# Patient Record
Sex: MALE | Race: WHITE | NOT HISPANIC OR LATINO | Employment: OTHER | URBAN - METROPOLITAN AREA
[De-identification: names, ages, dates, MRNs, and addresses within clinical notes are randomized per-mention and may not be internally consistent; named-entity substitution may affect disease eponyms.]

---

## 2019-12-12 NOTE — PATIENT DISCUSSION
Recommend warm comp, lid scrubs, artificial tears, OTC ointment qhs and azithromycin and begin Omega 3s.

## 2020-01-16 NOTE — PATIENT DISCUSSION
Recommend continuing warm comp, lid scrubs (will try sterilid), artificial tears, OTC ointment qhs and azithromycin and begin Omega 3s.

## 2021-10-01 ENCOUNTER — NEW PATIENT COMPREHENSIVE (OUTPATIENT)
Dept: URBAN - METROPOLITAN AREA CLINIC 35 | Facility: CLINIC | Age: 51
End: 2021-10-01

## 2021-10-01 DIAGNOSIS — H52.201: ICD-10-CM

## 2021-10-01 DIAGNOSIS — H52.4: ICD-10-CM

## 2021-10-01 DIAGNOSIS — H52.7: ICD-10-CM

## 2021-10-01 DIAGNOSIS — H52.11: ICD-10-CM

## 2021-10-01 DIAGNOSIS — H52.02: ICD-10-CM

## 2021-10-01 PROCEDURE — 92015 DETERMINE REFRACTIVE STATE: CPT

## 2021-10-01 PROCEDURE — 92004 COMPRE OPH EXAM NEW PT 1/>: CPT

## 2021-10-01 ASSESSMENT — VISUAL ACUITY
OD_SC: 20/40
OS_SC: 20/100
OU_SC: 20/25
OU_SC: 20/30
OS_SC: 20/25
OD_SC: 20/60-1

## 2021-10-01 ASSESSMENT — TONOMETRY
OD_IOP_MMHG: 14
OS_IOP_MMHG: 14

## 2022-10-06 ENCOUNTER — PREPPED CHART (OUTPATIENT)
Dept: URBAN - METROPOLITAN AREA CLINIC 35 | Facility: CLINIC | Age: 52
End: 2022-10-06